# Patient Record
Sex: FEMALE | Race: WHITE | NOT HISPANIC OR LATINO | Employment: OTHER | URBAN - METROPOLITAN AREA
[De-identification: names, ages, dates, MRNs, and addresses within clinical notes are randomized per-mention and may not be internally consistent; named-entity substitution may affect disease eponyms.]

---

## 2020-12-09 ENCOUNTER — TRANSFERRED RECORDS (OUTPATIENT)
Dept: HEALTH INFORMATION MANAGEMENT | Facility: CLINIC | Age: 80
End: 2020-12-09

## 2021-01-20 ENCOUNTER — TRANSCRIBE ORDERS (OUTPATIENT)
Dept: OTHER | Age: 81
End: 2021-01-20

## 2021-01-20 DIAGNOSIS — D31.32 CHOROIDAL NEVUS OF LEFT EYE: Primary | ICD-10-CM

## 2021-01-21 ENCOUNTER — TELEPHONE (OUTPATIENT)
Dept: OPHTHALMOLOGY | Facility: CLINIC | Age: 81
End: 2021-01-21

## 2021-01-21 NOTE — TELEPHONE ENCOUNTER
Pt scheduled February 22nd at [facility] location    Pt/daughter aware of date/time/location at [facility] and has main clinic number  Note to  to send new patient packet    Duran Willson RN 2:23 PM 01/22/21    ---      Left message at 1230-- [phone] -- spoke to patient at 1230    Pt been followed by Dr. Mancuso for nevus on eye     Couple months ago new changes per pt    Pt seen recently by another eye doctor at [place] and referred to [facility]    Tentatively scheduled with Dr. Elam on 4th Monday afternoon for diagnosis February 22nd at 11 AM    [facility] [phone] (previously left message with call back number)    Duran Willson RN 12:42 PM 01/22/21           Health Call Center    Phone Message    May a detailed message be left on voicemail: no - Contact daughter/Sherley to schedule at [phone]    Reason for Call: Appointment Intake    Referring Provider Name: Dr. 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TH[facility] [[id number]]    Travel Screening: Not Applicable

## 2021-02-15 DIAGNOSIS — H35.352 CYSTOID MACULAR EDEMA OF LEFT EYE: ICD-10-CM

## 2021-02-15 DIAGNOSIS — H31.8 CHOROIDAL LESION: Primary | ICD-10-CM

## 2021-02-22 ENCOUNTER — OFFICE VISIT (OUTPATIENT)
Dept: OPHTHALMOLOGY | Facility: CLINIC | Age: 81
End: 2021-02-22
Attending: OPHTHALMOLOGY
Payer: MEDICARE

## 2021-02-22 DIAGNOSIS — H31.8 CHOROIDAL LESION: Primary | ICD-10-CM

## 2021-02-22 DIAGNOSIS — H53.15 SUBJECTIVE VISUAL DISTURBANCE OF IMAGE SIZE: ICD-10-CM

## 2021-02-22 DIAGNOSIS — H31.8 CHOROIDAL LESION: ICD-10-CM

## 2021-02-22 PROCEDURE — 92015 DETERMINE REFRACTIVE STATE: CPT | Mod: GY

## 2021-02-22 PROCEDURE — 76510 OPH US DX B-SCAN&QUAN A-SCAN: CPT | Performed by: OPHTHALMOLOGY

## 2021-02-22 PROCEDURE — 92250 FUNDUS PHOTOGRAPHY W/I&R: CPT | Mod: 59 | Performed by: OPHTHALMOLOGY

## 2021-02-22 PROCEDURE — 99203 OFFICE O/P NEW LOW 30 MIN: CPT | Performed by: OPHTHALMOLOGY

## 2021-02-22 PROCEDURE — 92134 CPTRZ OPH DX IMG PST SGM RTA: CPT | Performed by: OPHTHALMOLOGY

## 2021-02-22 PROCEDURE — 99207 FUNDUS PHOTOS OU (BOTH EYES): CPT | Performed by: OPHTHALMOLOGY

## 2021-02-22 PROCEDURE — G0463 HOSPITAL OUTPT CLINIC VISIT: HCPCS

## 2021-02-22 RX ORDER — TACROLIMUS 1 MG/G
1 OINTMENT TOPICAL
COMMUNITY
Start: 2020-07-16

## 2021-02-22 RX ORDER — LEVOTHYROXINE SODIUM 50 UG/1
TABLET ORAL
COMMUNITY
Start: 2021-01-13

## 2021-02-22 RX ORDER — ACETAMINOPHEN 325 MG/1
650 TABLET ORAL
COMMUNITY

## 2021-02-22 RX ORDER — ASCORBATE CALCIUM 500 MG
TABLET ORAL
COMMUNITY

## 2021-02-22 RX ORDER — AMLODIPINE BESYLATE 2.5 MG/1
TABLET ORAL
COMMUNITY
Start: 2021-01-12

## 2021-02-22 RX ORDER — KETOCONAZOLE 20 MG/G
CREAM TOPICAL
COMMUNITY
Start: 2020-07-23

## 2021-02-22 RX ORDER — MOMETASONE FUROATE 1 MG/G
OINTMENT TOPICAL
COMMUNITY
Start: 2020-07-24

## 2021-02-22 RX ORDER — DOCUSATE SODIUM 100 MG/1
CAPSULE, LIQUID FILLED ORAL
COMMUNITY
Start: 2020-11-09

## 2021-02-22 RX ORDER — GABAPENTIN 300 MG/1
CAPSULE ORAL
COMMUNITY
Start: 2020-11-05

## 2021-02-22 RX ORDER — CLINDAMYCIN PHOSPHATE 10 UG/ML
LOTION TOPICAL
COMMUNITY
Start: 2020-07-17

## 2021-02-22 RX ORDER — TRANSGALACTOOLIGOSACCHARIDES 2.75 G
POWDER IN PACKET (EA) ORAL
COMMUNITY

## 2021-02-22 RX ORDER — MUPIROCIN 20 MG/G
OINTMENT TOPICAL
COMMUNITY
Start: 2021-01-18

## 2021-02-22 RX ORDER — ALENDRONATE SODIUM 70 MG/1
TABLET ORAL
COMMUNITY
Start: 2020-12-24

## 2021-02-22 RX ORDER — SIMVASTATIN 20 MG
TABLET ORAL
COMMUNITY
Start: 2021-01-13

## 2021-02-22 RX ORDER — MOMETASONE FUROATE 1 MG/G
CREAM TOPICAL
COMMUNITY
Start: 2020-05-29

## 2021-02-22 ASSESSMENT — VISUAL ACUITY
METHOD: SNELLEN - LINEAR
METHOD_MR: PT REQUEST MR TODAY
OD_CC: 20/30
OS_CC: 20/20
CORRECTION_TYPE: GLASSES

## 2021-02-22 ASSESSMENT — SLIT LAMP EXAM - LIDS
COMMENTS: NORMAL
COMMENTS: NORMAL

## 2021-02-22 ASSESSMENT — REFRACTION_MANIFEST
OD_CYLINDER: +0.75
OS_SPHERE: +0.75
OS_CYLINDER: +0.50
OD_ADD: +3.00
OS_AXIS: 013
OS_ADD: +3.00
OD_SPHERE: -0.25
OD_AXIS: 180

## 2021-02-22 ASSESSMENT — CUP TO DISC RATIO
OS_RATIO: 0.3
OD_RATIO: 0.3

## 2021-02-22 ASSESSMENT — REFRACTION_WEARINGRX
OS_ADD: +3.00
OD_CYLINDER: +1.00
OD_SPHERE: -1.00
OS_CYLINDER: +0.75
OS_SPHERE: +0.25
SPECS_TYPE: BIFOCAL
OD_AXIS: 024
OS_AXIS: 180
OD_ADD: +3.00

## 2021-02-22 ASSESSMENT — CONF VISUAL FIELD
OS_NORMAL: 1
OD_NORMAL: 1

## 2021-02-22 ASSESSMENT — TONOMETRY
OS_IOP_MMHG: 13
OD_IOP_MMHG: 12
IOP_METHOD: TONOPEN

## 2021-02-22 ASSESSMENT — EXTERNAL EXAM - RIGHT EYE: OD_EXAM: NORMAL

## 2021-02-22 ASSESSMENT — EXTERNAL EXAM - LEFT EYE: OS_EXAM: NORMAL

## 2021-02-22 NOTE — PROGRESS NOTES
CC -   Choroidal nevus OS    INTERVAL HISTORY - Initial visit with me.  No changes since seen in [facility] -   Colleen Ely is a  80 year old year-old patient referred by Dr Downing from [facility] for evaluation and treat of a choroidal lesion left eye    Was in [place], [place], Nevus  ~ 2012.  Followed by [facility] for nevus since 2018,  last seen 1/2020, saw Dr. Downing 12/2020 concern d/t recent diagnosis renal CA 12/2020  No smoking  No DM      PAST OCULAR SURGERY  CE/IOL OU    RETINAL IMAGING:  OCT 2-22-21  OD -  Macula - mild outer irregular,  PVD  OS -  Macula - mild outer irregular, PVD   Nevus - mild elevation, tr SRF & drusen,  ~ 722 um (2/2021)      U/S OS  A-scan - unreliable d/t size  B=scan - minimal elevation      ASSESSMENT & PLAN    # Choroidal nevus OS   - no high risk features   - noted ~ 2012 per patient   - recent renal CA not likely related   - observe   - recheck 3 months locally as precaution   - if stable then 6-12 months (very low suspicion)      # PVD OU      # mild dry AMD OU            return to clinic: PRN to [facility]    ATTESTATION     Attending Attestation:     Complete documentation of historical and exam elements from today's encounter can be found in the full encounter summary report (not reduplicated in this progress note).  I personally obtained the chief complaint(s) and history of present illness.  I confirmed and edited as necessary the review of systems, past medical/surgical history, family history, social history, and examination findings as documented by others; and I examined the patient myself.  I personally reviewed the relevant tests, images, and reports as documented above.  I formulated and edited as necessary the assessment and plan and discussed the findings and management plan with the patient and family    Delia Elam MD, PhD  , Vitreoretinal Surgery  Department of Ophthalmology  [facility]

## 2021-02-22 NOTE — NURSING NOTE
Chief Complaint(s) and History of Present Illness(es)     Retinal Evaluation     In left eye.  Associated symptoms include dryness.  Negative for eye pain, redness, tearing, floaters and flashes.  Pain was noted as 0/10.              Comments     Pt is here for a retinal evaluation for Nevus of the LE from Dr. Mancuso. Pt notes vision seems ok with current glasses. Pt does note some intermittent dryness. Pt denies any floaters in her vision anymore.     Ocular meds:  ATs prn BE    BRENDA Cabrera 12:51 PM February 22, 2021

## 2021-02-22 NOTE — LETTER
2/22/2021       RE: Colleen Ely  [address]  [place] [address]     Dear Colleague,    Thank you for referring your patient, Colleen Ely, to the [facility] at [facility]. Please see a copy of my visit note below.    CC -   Choroidal nevus OS    INTERVAL HISTORY - Initial visit with me.  No changes since seen in [place]    PM -   Colleen Ely is a  80 year old year-old patient referred by Dr Downnig from [facility] for evaluation and treat of a choroidal lesion left eye    Was in [place], [place], Nevus  ~ 2012.  Followed by [facility] for nevus since 2018,  last seen 1/2020, saw Dr. Downing 12/2020 concern d/t recent diagnosis renal CA 12/2020  No smoking  No DM    PAST OCULAR SURGERY  CE/IOL OU    RETINAL IMAGING:  OCT 2-22-21  OD -  Macula - mild outer irregular,  PVD  OS -  Macula - mild outer irregular, PVD   Nevus - mild elevation, tr SRF & drusen,  ~ 722 um (2/2021)    U/S OS  A-scan - unreliable d/t size  B=scan - minimal elevation    ASSESSMENT & PLAN    # Choroidal nevus OS   - no high risk features   - noted ~ 2012 per patient   - recent renal CA not likely related   - observe   - recheck 3 months locally as precaution   - if stable then 6-12 months (very low suspicion)    # PVD OU    # mild dry AMD OU    return to clinic: PRN to [facility]    ATTESTATION   Attending Attestation:  Complete documentation of historical and exam elements from today's encounter can be found in the full encounter summary report (not reduplicated in this progress note).  I personally obtained the chief complaint(s) and history of present illness.  I confirmed and edited as necessary the review of systems, past medical/surgical history, family history, social history, and examination findings as documented by others; and I examined the patient myself.  I personally reviewed the relevant tests, images, and reports as documented above.  I formulated and edited as  necessary the assessment and plan and discussed the findings and management plan with the patient and family. Delia Elam MD, PhD    Again, thank you for allowing me to participate in the care of your patient.      Sincerely,    Delia Elam MD, PhD  , Vitreoretinal Surgery  Department of Ophthalmology & Visual Neurosciences  [facility]